# Patient Record
Sex: FEMALE | Race: WHITE | NOT HISPANIC OR LATINO | Employment: STUDENT | URBAN - NONMETROPOLITAN AREA
[De-identification: names, ages, dates, MRNs, and addresses within clinical notes are randomized per-mention and may not be internally consistent; named-entity substitution may affect disease eponyms.]

---

## 2020-07-07 ENCOUNTER — OFFICE VISIT (OUTPATIENT)
Dept: PSYCHIATRY | Facility: CLINIC | Age: 20
End: 2020-07-07

## 2020-07-07 VITALS
WEIGHT: 181 LBS | HEIGHT: 64 IN | SYSTOLIC BLOOD PRESSURE: 128 MMHG | BODY MASS INDEX: 30.9 KG/M2 | HEART RATE: 58 BPM | DIASTOLIC BLOOD PRESSURE: 60 MMHG

## 2020-07-07 DIAGNOSIS — F41.1 GENERALIZED ANXIETY DISORDER: ICD-10-CM

## 2020-07-07 DIAGNOSIS — F39 UNSPECIFIED MOOD (AFFECTIVE) DISORDER (HCC): Primary | ICD-10-CM

## 2020-07-07 PROCEDURE — 90792 PSYCH DIAG EVAL W/MED SRVCS: CPT | Performed by: NURSE PRACTITIONER
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
## 2020-08-04 ENCOUNTER — OFFICE VISIT (OUTPATIENT)
Dept: PSYCHIATRY | Facility: CLINIC | Age: 20
End: 2020-08-04

## 2020-08-04 DIAGNOSIS — F41.1 GENERALIZED ANXIETY DISORDER: ICD-10-CM

## 2020-08-04 DIAGNOSIS — F39 UNSPECIFIED MOOD (AFFECTIVE) DISORDER (HCC): Primary | ICD-10-CM

## 2020-08-04 PROCEDURE — 99441 PR PHYS/QHP TELEPHONE EVALUATION 5-10 MIN: CPT | Performed by: NURSE PRACTITIONER

## 2020-08-04 NOTE — PROGRESS NOTES
You have chosen to receive care through a telephone visit. Do you consent to use a telephone visit for your medical care today? Yes        Subjective   Diana Linder is a 20 y.o. female is here today for medication management follow-up.    This provider is located at [facility] at [address] [place] [address]. The provider identified herself as well as her credentials. The Patient is at/in home by herself/himself, using her/his phone because problems with video connection. The patient's condition being diagnosed/treated is appropriate for telemedicine. The patient gave consent to be seen remotely, and when consent is given they understand that the consent allows for patient identifiable information to be sent to a third party as needed. They may refuse to be seen remotely at any time. The electronic data is encrypted and password protected, and the patient has been advised of the potential risks to privacy not withstanding such measures.      Chief Complaint:  Anxiety and depression     History of Present Illness:   Patient presents today for follow up for medication management for depression and anxiety. Patient states she moves into the apartment on the 16th. Patient states mood hasn't been the best. Patient states taken a few spells in which she didn't want to do anything. Patient reports a few crying spells. Patient states getting pretty nervous about school started. Patient states she has only one classes online and the rest are in person. Patient reports currently depression is at a 7 on a 0-10 scale with 10 being the worst. Patient reports symptoms of depression of crying spells, insomnia, depressed mood, overwhelmed, and decreased energy. Patient states good days and bad days are a mix. Patient states thinking about mom a lot lately. Patient states a little aggravated but feels like its with everything going on. Patient reports anxiety is at a 8 on a 0-10 scale with 10 being the worst.  Patient reports having a couple panic attacks. Patient reports panic attacks last 2 minutes and during an attacks patient has hyperventilating, overwhelmed, crying, and feeling like choking. Patient states having a hard time falling asleep. Patient reports sleeping 4-5 hours a night and patient denies any nightmares. Patient states also waking up a few times throughout the night. Patient reports appetite is good and eating 2-3 meals a day. Patient denies any nausea or vomiting. Patient denies any auditory or visual hallucinations. Patient adamantly denies any SI or HI. Patient denies any side effects to medications. Patient denies any medical changes since last visit.   The following portions of the patient's history were reviewed and updated as appropriate: allergies, current medications, past family history, past medical history, past social history, past surgical history and problem list.    Review of Systems   Constitutional: Negative.    Respiratory: Negative.    Cardiovascular: Negative.    Gastrointestinal: Negative.    Neurological: Negative.    Psychiatric/Behavioral: Positive for dysphoric mood and sleep disturbance. The patient is nervous/anxious.        Objective   Physical Exam   Constitutional: She is cooperative.   Neurological: She is alert.   Psychiatric: Her speech is normal. Cognition and memory are normal.     There were no vitals taken for this visit. There is no height or weight on file to calculate BMI.  Unable to obtain vitals due to telephone visit.     No Known Allergies    Medication List:   Current Outpatient Medications   Medication Sig Dispense Refill   • SPRINTEC 28 0.25-35 MG-MCG per tablet        No current facility-administered medications for this visit.        Mental Status Exam:   Hygiene:   [place]  Cooperation:  Cooperative  Eye Contact:  [place]  Psychomotor Behavior:  [place]  Affect:  [place]  Hopelessness: Denies  Speech:  Normal  Thought Process:  Goal directed and Linear  Thought  Content:  Normal  Suicidal:  None  Homicidal:  None  Hallucinations:  None  Delusion:  None  Memory:  Intact  Orientation:  Person, Place, Time and Situation  Reliability:  fair  Insight:  Fair  Judgement:  Fair  Impulse Control:  Fair  Physical/Medical Issues:  No               Assessment/Plan   Diagnoses and all orders for this visit:    Unspecified mood (affective) disorder (CMS/HCC)    Generalized anxiety disorder          Discussed medication options with patient. Patient states she still feels like she doesn't not want to start medication at this time. Reviewed the risks, benefits, and side effects of the medications; patient acknowledged and verbally consented.  Patient is agreeable to call the office with any questions, concerns, or worsening of symptoms.  Patient is aware to call 911 or go to the nearest ER should begin having SI/HI.        Follow up in four weeks       Errors in dictation may reflect use of voice recognition software and not all errors in transcription may have been detected prior to signing.      This visit has been rescheduled as a phone visit to comply with patient safety concerns in accordance with CDC recommendations. Total time of discussion was 6 minutes.                This document has been electronically signed by DEBORAH Flores   August 26, 2020 09:29

## 2020-09-02 ENCOUNTER — OFFICE VISIT (OUTPATIENT)
Dept: PSYCHIATRY | Facility: CLINIC | Age: 20
End: 2020-09-02

## 2020-09-02 DIAGNOSIS — F39 UNSPECIFIED MOOD (AFFECTIVE) DISORDER (HCC): Primary | ICD-10-CM

## 2020-09-02 DIAGNOSIS — F41.1 GENERALIZED ANXIETY DISORDER: ICD-10-CM

## 2020-09-02 PROCEDURE — 99441 PR PHYS/QHP TELEPHONE EVALUATION 5-10 MIN: CPT | Performed by: NURSE PRACTITIONER

## 2020-09-02 NOTE — PROGRESS NOTES
You have chosen to receive care through a telephone visit. Do you consent to use a telephone visit for your medical care today? Yes        Subjective   Diana Linder is a 20 y.o. female is here today for medication management follow-up.    This provider is located at [facility] at [address] [place] [address]. The provider identified herself as well as her credentials. The Patient is at/in home by herself/himself, using her/his phone because problems with video connection. The patient's condition being diagnosed/treated is appropriate for telemedicine. The patient gave consent to be seen remotely, and when consent is given they understand that the consent allows for patient identifiable information to be sent to a third party as needed. They may refuse to be seen remotely at any time. The electronic data is encrypted and password protected, and the patient has been advised of the potential risks to privacy not withstanding such measures.      Chief Complaint:  Anxiety depression     History of Present Illness:    Patient presents today for follow up for medication management for depression and anxiety. Patient states everything is going alright but had a harder week with school this week. Patient states they approved her emotional support animal and she moved her in this week. Patient states dealing with a lot of stuff regarding roommates. Patient reports currently depression is at a 7 on a 0-10 scale with 10 being the worst. Patient reports symptoms of depression of crying spells, depressed mood, overwhelmed, and some irritability. Patient states feeling very frustrated. Patient reports anxiety is at a 8 on a 0-10 scale with 10 being the worst. Patient reports having a couple panic attacks. Patient reports panic attacks last about 2 minutes and during an attacks patient has crying and shortness of breath. Patient states not sleeping good lately with stress. Patient reports sleeping 4 hours a  night and patient denies any nightmares. Patient reports appetite is alright and eating 2 meals a day. Patient denies any auditory or visual hallucinations. Patient adamantly denies any SI or HI. Patient denies any side effects to medications. Patient denies any medical changes since last visit.   The following portions of the patient's history were reviewed and updated as appropriate: allergies, current medications, past family history, past medical history, past social history, past surgical history and problem list.    Review of Systems   Constitutional: Negative.    Respiratory: Negative.    Cardiovascular: Negative.    Gastrointestinal: Negative.    Neurological: Negative.    Psychiatric/Behavioral: Positive for dysphoric mood and sleep disturbance. The patient is nervous/anxious.        Objective   Physical Exam   Constitutional: She is cooperative.   Neurological: She is alert.   Psychiatric: Her speech is normal. Cognition and memory are normal.     There were no vitals taken for this visit. There is no height or weight on file to calculate BMI.  Unable to obtain vitals due to telephone visit.     No Known Allergies    Medication List:   Current Outpatient Medications   Medication Sig Dispense Refill   • SPRINTEC 28 0.25-35 MG-MCG per tablet        No current facility-administered medications for this visit.        Mental Status Exam:   Hygiene:   [place]  Cooperation:  Cooperative  Eye Contact:  [place]  Psychomotor Behavior:  [place]  Affect:  [place]  Hopelessness: Denies  Speech:  Normal  Thought Process:  Goal directed and Linear  Thought Content:  Normal  Suicidal:  None  Homicidal:  None  Hallucinations:  None  Delusion:  None  Memory:  Intact  Orientation:  Person, Place, Time and Situation  Reliability:  fair  Insight:  Fair  Judgement:  Fair  Impulse Control:  Fair  Physical/Medical Issues:  No                 Assessment/Plan   Diagnoses and all orders for this visit:    Unspecified mood (affective) disorder  (CMS/[facility])    Generalized anxiety disorder            Discussed medication options with patient. Patient states she does not want to start medications at this time.  Reviewed the risks, benefits, and side effects of the medications; patient acknowledged and verbally consented.  Patient is agreeable to call the office with any questions, concerns, or worsening of symptoms. Patient is aware to call 911 or go to the nearest ER should begin having SI/HI.          Follow up in four weeks      Errors in dictation may reflect use of voice recognition software and not all errors in transcription may have been detected prior to signing.      This visit has been rescheduled as a phone visit to comply with patient safety concerns in accordance with CDC recommendations. Total time of discussion was 5 minutes.                 This document has been electronically signed by DEBORAH Flores   September 2, 2020 14:46

## 2021-03-01 ENCOUNTER — OFFICE VISIT (OUTPATIENT)
Dept: PSYCHIATRY | Facility: CLINIC | Age: 21
End: 2021-03-01

## 2021-03-01 DIAGNOSIS — F41.1 GENERALIZED ANXIETY DISORDER: ICD-10-CM

## 2021-03-01 DIAGNOSIS — F39 UNSPECIFIED MOOD (AFFECTIVE) DISORDER (HCC): Primary | ICD-10-CM

## 2021-03-01 PROCEDURE — 99212 OFFICE O/P EST SF 10 MIN: CPT | Performed by: NURSE PRACTITIONER

## 2021-03-01 NOTE — PROGRESS NOTES
You have chosen to receive care through a telephone visit. Do you consent to use a telephone visit for your medical care today? Yes        Subjective   Diana Linder is a 20 y.o. female is here today for medication management follow-up.    This provider is located at [facility] at [address] [place] [address]. The provider identified herself as well as her credentials.   The Patient is at/in home  by herself/himself, using her/his phone because problems with video connection. The patient's condition being diagnosed/treated is appropriate for telemedicine. The patient gave consent to be seen remotely, and when consent is given they understand that the consent allows for patient identifiable information to be sent to a third party as needed.   They may refuse to be seen remotely at any time. The electronic data is encrypted and password protected, and the patient has been advised of the potential risks to privacy not withstanding such measures.       Chief Complaint:  Depression     History of Present Illness:    Patient presents today for follow up for medication management for depression. Patient states she has been doing good. Patient states school is going ok but has been a little rough. Patient states she still has her emotional support animal with her at the apartment. Patient states her dog helps keep her busy. Patient states had a few swings with mood. Patient states mood is about the same as it has been. Patient states she has been having a few days of feeling more down. Patient reports currently depression is at a 7 on a 0-10 scale with 10 being the worst. Patient reports symptoms of depression of crying spells, irritable, no energy to do things, depressed mood, and mood swings. Patient states she is going next week to [place] to tell a friend bye. Patient states she is upset about a friend leaving to join [employer]. Patient states her anxiety is better some days then others. Patient  reports anxiety is at a 4 on a 0-10 scale with 10 being the worst on good days. Patient states its an 8 on bad days. Patient reports having couple panic attacks. Patient reports panic attacks last a couple minutes and during an attacks patient has hyperventilate, chest pain, and panic feeling. Patient states some nights sleep good and then some not good. Patient reports having a couple nightmares mainly about mom. Patient reports appetite is good and eating 2-3 meals a day. Patient denies any auditory or visual hallucinations. Patient adamantly denies any SI or HI. Patient denies any side effects to medications. Patient denies any medical changes since last visit.   The following portions of the patient's history were reviewed and updated as appropriate: allergies, current medications, past family history, past medical history, past social history, past surgical history and problem list.    Review of Systems   Constitutional: Negative.    Respiratory: Negative.    Cardiovascular: Negative.    Gastrointestinal: Negative.    Neurological: Negative.    Psychiatric/Behavioral: Positive for agitation, dysphoric mood and sleep disturbance. The patient is nervous/anxious.        Objective   Physical Exam  Neurological:      Mental Status: She is alert.   Psychiatric:         Speech: Speech normal.         Cognition and Memory: Cognition and memory normal.       There were no vitals taken for this visit. There is no height or weight on file to calculate BMI.  Unable to obtain vitals due to telephone visit.     No Known Allergies    Medication List:   Current Outpatient Medications   Medication Sig Dispense Refill   • SPRINTEC 28 0.25-35 MG-MCG per tablet        No current facility-administered medications for this visit.        Mental Status Exam:   Hygiene:   [place]  Cooperation:  [place]  Eye Contact:  [place]  Psychomotor Behavior:  [place]  Affect:  [place]  Hopelessness: Denies  Speech:  Normal  Thought Process:  Goal directed and  Linear  Thought Content:  Normal  Suicidal:  None  Homicidal:  None  Hallucinations:  None  Delusion:  None  Memory:  Intact  Orientation:  Person, Place, Time and Situation  Reliability:  fair  Insight:  Fair  Judgement:  Fair  Impulse Control:  Fair  Physical/Medical Issues:  No             Assessment/Plan   Diagnoses and all orders for this visit:    1. Unspecified mood (affective) disorder (CMS/HCC) (Primary)    2. Generalized anxiety disorder            Discussed medication options with patient. Patient states at this time she feels like she is doing alright without medication. Patient states her emotional support dog has helped a lot with her mood.  Reviewed the risks, benefits, and side effects of the medications; patient acknowledged and verbally consented.  Patient is agreeable to call the office with any questions, concerns, or worsening of symptoms.  Patient is aware to call 911 or go to the nearest ER should begin having SI/HI.          Follow up in eight weeks      Errors in dictation may reflect use of voice recognition software and not all errors in transcription may have been detected prior to signing.        This visit has been rescheduled as a phone visit to comply with patient safety concerns in accordance with CDC recommendations. Total time of discussion was 11 minutes.                 This document has been electronically signed by DEBORAH Flores   March 1, 2021 16:11 EST

## 2021-03-16 ENCOUNTER — BULK ORDERING (OUTPATIENT)
Dept: CASE MANAGEMENT | Facility: OTHER | Age: 21
End: 2021-03-16

## 2021-03-16 DIAGNOSIS — Z23 IMMUNIZATION DUE: ICD-10-CM
